# Patient Record
Sex: MALE | Race: WHITE | NOT HISPANIC OR LATINO | Employment: OTHER | URBAN - METROPOLITAN AREA
[De-identification: names, ages, dates, MRNs, and addresses within clinical notes are randomized per-mention and may not be internally consistent; named-entity substitution may affect disease eponyms.]

---

## 2018-10-01 ENCOUNTER — OFFICE VISIT (OUTPATIENT)
Dept: DERMATOLOGY | Facility: CLINIC | Age: 78
End: 2018-10-01
Payer: COMMERCIAL

## 2018-10-01 VITALS — OXYGEN SATURATION: 98 % | SYSTOLIC BLOOD PRESSURE: 147 MMHG | DIASTOLIC BLOOD PRESSURE: 82 MMHG | HEART RATE: 58 BPM

## 2018-10-01 DIAGNOSIS — Z85.828 HISTORY OF SKIN CANCER: Primary | ICD-10-CM

## 2018-10-01 DIAGNOSIS — L92.0 GRANULOMA ANNULARE: ICD-10-CM

## 2018-10-01 DIAGNOSIS — D18.00 ANGIOMA: ICD-10-CM

## 2018-10-01 DIAGNOSIS — L81.4 LENTIGO: ICD-10-CM

## 2018-10-01 DIAGNOSIS — L82.1 SK (SEBORRHEIC KERATOSIS): ICD-10-CM

## 2018-10-01 PROCEDURE — 99203 OFFICE O/P NEW LOW 30 MIN: CPT | Performed by: DERMATOLOGY

## 2018-10-01 RX ORDER — IBUPROFEN 200 MG
TABLET ORAL
COMMUNITY
Start: 2016-05-02

## 2018-10-01 RX ORDER — TRAMADOL HYDROCHLORIDE 50 MG/1
TABLET ORAL
COMMUNITY
Start: 2018-07-19

## 2018-10-01 RX ORDER — LISINOPRIL 40 MG/1
40 TABLET ORAL
COMMUNITY
Start: 2018-05-10

## 2018-10-01 RX ORDER — AMLODIPINE BESYLATE 5 MG/1
TABLET ORAL
COMMUNITY
Start: 2018-07-30

## 2018-10-01 RX ORDER — HUMAN INSULIN 100 [IU]/ML
INJECTION, SUSPENSION SUBCUTANEOUS
COMMUNITY
Start: 2018-09-29

## 2018-10-01 RX ORDER — HYDROCHLOROTHIAZIDE 12.5 MG/1
TABLET ORAL
COMMUNITY
Start: 2018-08-14

## 2018-10-01 RX ORDER — TIZANIDINE 2 MG/1
TABLET ORAL
COMMUNITY
Start: 2018-09-21

## 2018-10-01 RX ORDER — ASPIRIN 81 MG/1
81 TABLET ORAL
COMMUNITY
Start: 2014-06-19

## 2018-10-01 RX ORDER — ATORVASTATIN CALCIUM 40 MG/1
40 TABLET, FILM COATED ORAL
COMMUNITY
Start: 2018-05-10

## 2018-10-01 RX ORDER — SYRINGE AND NEEDLE,INSULIN,1ML 29 G X1/2"
SYRINGE, EMPTY DISPOSABLE MISCELLANEOUS
COMMUNITY
Start: 2018-09-29

## 2018-10-01 RX ORDER — GLUCOSAMINE HCL/CHONDROITIN SU 500-400 MG
CAPSULE ORAL
COMMUNITY
Start: 2016-08-28

## 2018-10-01 RX ORDER — NITROGLYCERIN 0.4 MG/1
0.4 TABLET SUBLINGUAL
COMMUNITY
Start: 2017-08-16

## 2018-10-01 RX ORDER — AMLODIPINE BESYLATE 5 MG/1
5 TABLET ORAL
COMMUNITY
Start: 2018-09-17

## 2018-10-01 RX ORDER — TRIAMCINOLONE ACETONIDE 5 MG/G
OINTMENT TOPICAL
COMMUNITY
Start: 2018-07-19

## 2018-10-01 RX ORDER — TIZANIDINE 2 MG/1
2 TABLET ORAL
COMMUNITY
Start: 2018-07-19

## 2018-10-01 RX ORDER — FLURBIPROFEN SODIUM 0.3 MG/ML
SOLUTION/ DROPS OPHTHALMIC
COMMUNITY
Start: 2018-06-28

## 2018-10-01 RX ORDER — SOTALOL HYDROCHLORIDE 80 MG/1
40 TABLET ORAL
COMMUNITY
Start: 2018-05-10

## 2018-10-01 RX ORDER — LEVOTHYROXINE SODIUM 125 UG/1
125 TABLET ORAL
COMMUNITY
Start: 2018-09-17

## 2018-10-01 RX ORDER — TRAMADOL HYDROCHLORIDE 50 MG/1
TABLET ORAL
COMMUNITY
Start: 2018-09-15

## 2018-10-01 NOTE — LETTER
10/1/2018         RE: Otis Mills  [address] [facility] [address]        Dear Colleague,    Thank you for referring your patient, Otis Mills, to the [facility]. Please see a copy of my visit note below.    Otis Mills is a 77 year old year old male patient here today for hx of non-melanoma skin cancer.  He notes brown spot on left temple and left neck.   .  Patient states this has been present for a while.  Patient reports the following symptoms:  none .  Patient reports the following previous treatments none.  Patient reports the following modifying factors none.  Associated symptoms: none.  Patient has no other skin complaints today.  Remainder of the HPI, Meds, PMH, Allergies, FH, and SH was reviewed in chart.      Past Medical History:   Diagnosis Date     Anxiety 10/27/2014     Arthritis 7/28/2011     Atrial fibrillation (H) 7/28/2011     Basal cell carcinoma      BPH (benign prostatic hypertrophy) 9/8/2014     Carpopedal spasm 9/8/2014     Constipation 10/27/2014     Coronary artery disease 7/28/2011     Coronary stent 7/28/2011     Depression 7/28/2011     Diabetes mellitus, type 2 (H) 9/8/2014     Disorder of thyroid 10/27/2014     Dry mouth 9/8/2014     Dystonia 9/8/2014     Electrocution 10/27/2014     Elevated PSA 7/28/2011     EMG (electromyogram) abnormalities 10/26/2014    The EMG study showed unusual recruitment pattern with very early recruitment and as amplitude increased as recruitment increased. With mild recruitment a 3Hz tremor was noted which was most obvious distally and reduced in gradient to the point that it was not noticeable in stewart biceps.      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     Headache(784.0) 9/8/2014     Hearing aid worn 7/28/2011     Hearing loss 7/28/2011     HL (hearing loss) 10/27/2014     Rosebud (hard of hearing) 9/8/2014      Hyperplastic colonic polyp 9/8/2014     Hypertension 7/28/2011     Hypertrophy of prostate with urinary obstruction and other lower urinary tract symptoms (LUTS) 9/8/2014     Hypothyroidism 7/28/2011     Insomnia 9/8/2014     Irregular heart beat 10/27/2014     Low back pain 9/8/2014     Lung nodule 7/28/2011     Memory loss 10/27/2014     Muscle tenderness 10/27/2014     Myalgia 9/8/2014     Night sweats 10/27/2014     Other chest pain (TIGHTNESS) 9/8/2014     Pain in joint, hand 9/8/2014     Positive anti-CCP test 9/8/2014    crp and sed rate are supposed to be negative. Taking plaquenil      Prostate disorder 10/27/2014     S/P coronary artery stent placement 10/27/2014     S/P rotator cuff surgery 7/28/2011     Skin cancer 7/28/2011     Tremor 7/28/2011     Umbilical hernia 7/28/2011     Unspecified cardiovascular disease (ASCVD) 9/8/2014     Unspecified vitamin D deficiency 9/8/2014     Urinary frequency 7/28/2011     Urinary tract infection 7/28/2011     Urinary urgency 7/28/2011     Weakness of both legs 9/8/2014     Wears glasses 7/28/2011       Past Surgical History:   Procedure Laterality Date     ARTHROSCOPY SHOULDER ROTATOR CUFF REPAIR      right     CARDIAC SURGERY  12/30/2008    1 stent      CARDIAC SURGERY      angioplasty     HERNIA REPAIR      left     hernia surgery      right     hernia surgery      left     hernia surgery      right     KNEE SURGERY  11/23/2009    right knee arthroscopic partial medial mesicectomy with decompression     PROSTATE SURGERY  2012    10/24/12 - quanta laser of prostate     SINUS SURGERY       skin surgery      skin cancer     TURP  2013    1/9/2013        Family History   Problem Relation Age of Onset     Cerebrovascular Disease Father      75     Myocardial Infarction Father      Dementia Father      Cerebrovascular Disease Mother      HEART DISEASE Mother      Hypertension Brother      x 4     Lipids Brother      x 4     Dementia Other      1/2 sister     Dementia  Brother      1/2 brother       Social History     Social History     Marital status:      Spouse name: N/A     Number of children: N/A     Years of education: N/A     Occupational History     Not on file.     Social History Main Topics     Smoking status: Former Smoker     Smokeless tobacco: Never Used      Comment: quit 40 yrs ago.      Alcohol use Yes      Comment: rare to none     Drug use: Not on file     Sexual activity: Not on file     Other Topics Concern     Not on file     Social History Narrative    Lives in [place]    Wife Kady    2 daughters    1 son           Outpatient Encounter Prescriptions as of 10/1/2018   Medication Sig Dispense Refill     amlodipine (NORVASC) 10 MG tablet Take 10 mg by mouth daily.       amLODIPine (NORVASC) 5 MG tablet Take 5 mg by mouth       amLODIPine (NORVASC) 5 MG tablet        aspirin 81 MG EC tablet Take 81 mg by mouth       aspirin 81 MG tablet Take 1 tablet by mouth daily.       atorvastatin (LIPITOR) 40 MG tablet Take 40 mg by mouth       Atorvastatin Calcium (LIPITOR PO) Take 20 mg by mouth daily       B-D U/F insulin pen needle        carbidopa-levodopa (SINEMET)  MG per tablet 1/2 tablet daily twice daily. 90 tablet 11     diazepam (VALIUM) 5 MG tablet Take  by mouth. 1 tab (5mg) 30 minutes Prior to mri scan; may repeat x 1 2 tablet 0     Glucose Blood (BLOOD GLUCOSE TEST STRIPS) STRP USE AS DIRECTED: TESTS UP TO 3 TIMES DAILY       hydrochlorothiazide (HYDRODIURIL) 25 MG tablet Take 25 mg by mouth daily.       hydrochlorothiazide 12.5 MG TABS tablet        ibuprofen (ADVIL/MOTRIN) 200 MG tablet        insulin regular (HUMULIN R/NOVOLIN R) 100 UNIT/ML injection Inject 4 units before breakfast and 6 units before supper.       levothyroxine (LEVOTHROID) 75 MCG tablet Take 100 mcg by mouth        levothyroxine (SYNTHROID/LEVOTHROID) 125 MCG tablet Take 125 mcg by mouth       lisinopril (PRINIVIL,ZESTRIL) 40 MG tablet Take 40 mg by mouth daily.        "lisinopril (PRINIVIL/ZESTRIL) 40 MG tablet Take 40 mg by mouth       lorazepam (ATIVAN) 0.5 MG tablet Take  by mouth. Take at least 30 minutes prior to mri scan.  May repeat x 1 if needed. Do not operate a vehicle after taking this medication 3 tablet 0     metFORMIN (GLUCOPHAGE) 1000 MG tablet Take 1,000 mg by mouth       metformin (GLUCOPHAGE) 1000 MG tablet Take 1,000 mg by mouth 2 times daily (with meals).       nitroGLYcerin (NITROSTAT) 0.4 MG sublingual tablet Place 0.4 mg under the tongue       NOVOLIN N RELION 100 UNIT/ML susp        omeprazole (PRILOSEC) 20 MG capsule Take 20 mg by mouth daily.       omeprazole (PRILOSEC) 20 MG CR capsule Take 20 mg by mouth       RELION INSULIN SYRINGE 31G X 5/16\" 0.3 ML        simvastatin (ZOCOR) 40 MG tablet Take 40 mg by mouth At Bedtime.       sotalol (BETAPACE) 80 MG tablet Take 40 mg by mouth       sotalol (BETAPACE) 80 MG tablet Take 0.5 tablets by mouth 2 times daily.       tamsulosin (FLOMAX) 0.4 MG 24 hr capsule Take 0.4 mg by mouth daily.       tiZANidine (ZANAFLEX) 2 MG tablet Take 2 mg by mouth       tiZANidine (ZANAFLEX) 2 MG tablet        traMADol (ULTRAM) 50 MG tablet Take 1 tab PO 2-3 times daily if needed for pain. To fill on/after 8/6/2018       traMADol (ULTRAM) 50 MG tablet        triamcinolone (KENALOG) 0.5 % OINT ointment        No facility-administered encounter medications on file as of 10/1/2018.              Review Of Systems  Skin: As above  Eyes: negative  Ears/Nose/Throat: negative  Respiratory: No shortness of breath, dyspnea on exertion, cough, or hemoptysis  Cardiovascular: negative  Gastrointestinal: negative  Genitourinary: negative  Musculoskeletal: negative  Neurologic: negative  Psychiatric: negative  Hematologic/Lymphatic/Immunologic: negative  Endocrine: negative      O:   NAD, WDWN, Alert & Oriented, Mood & Affect wnl, Vitals stable   Here today alone   /82  Pulse 58  SpO2 98%   General appearance normal   Vitals " stable   Alert, oriented and in no acute distress      Following lymph nodes palpated: Occipital, Cervical, Supraclavicular no lad   Stuck on papules and brown macules on trunk and ext    L neck and L temple stuck on papule   Red papules on trunk   Annular red plaques on trunk         The remainder of the full exam was unremarkable; the following areas were examined:  conjunctiva/lids, oral mucosa, neck, peripheral vascular system, abdomen, lymph nodes, digits/nails, eccrine and apocrine glands, scalp/hair, face, neck, chest, abdomen, buttocks, back, RUE, LUE, RLE, LLE       Eyes: Conjunctivae/lids:Normal     ENT: Lips, buccal mucosa, tongue: normal    MSK:Normal    Cardiovascular: peripheral edema none    Pulm: Breathing Normal    Lymph Nodes: No Head and Neck Lymphadenopathy     Neuro/Psych: Orientation:Normal; Mood/Affect:Normal      A/P:  1. GA  He declines treatment  2. Hx of basal cell carcinoma, seborrheic keratosis, letnigo, angioma  BENIGN LESIONS DISCUSSED WITH PATIENT:  I discussed the specifics of tumor, prognosis, and genetics of benign lesions.  I explained that treatment of these lesions would be purely cosmetic and not medically neccessary.  I discussed with patient different removal options including excision, cautery and /or laser.      Nature and genetics of benign skin lesions dicussed with patient.  Signs and Symptoms of skin cancer discussed with patient.  ABCDEs of melanoma reviewed with patient.  Patient encouraged to perform monthly skin exams.  UV precautions reviewed with patient.  Patient to follow up with Primary Care provider regarding elevated blood pressure.  Skin care regimen reviewed with patient: Eliminate harsh soaps, i.e. Dial, zest, irsih spring; Mild soaps such as Cetaphil or Dove sensitive skin, avoid hot or cold showers, aggressive use of emollients including vanicream, cetaphil or cerave discussed with patient.    Risks of non-melanoma skin cancer discussed with patient    Return to clinic 12 months  Patient to follow up with Primary Care provider regarding elevated blood pressure.          Again, thank you for allowing me to participate in the care of your patient.        Sincerely,        Michael Louis MD

## 2018-10-01 NOTE — NURSING NOTE
"Initial /82  Pulse 58  SpO2 98% Estimated body mass index is 27.14 kg/(m^2) as calculated from the following:    Height as of 10/27/14: 1.727 m (5' 8\").    Weight as of 10/27/14: 81 kg (178 lb 8 oz). .      "

## 2018-10-01 NOTE — MR AVS SNAPSHOT
After Visit Summary   10/1/2018    Otis Mills    MRN: [id number]           Patient Information     Date Of Birth          1940        Visit Information        Provider Department      10/1/2018 9:15 AM Michael Louis MD [facility]        Today's Diagnoses     History of skin cancer    -  1    Lentigo        SK (seborrheic keratosis)        Angioma        Granuloma annulare           Follow-ups after your visit        Who to contact     If you have questions or need follow up information about today's clinic visit or your schedule please contact [facility] directly at [phone].  Normal or non-critical lab and imaging results will be communicated to you by MyChart, letter or phone within 4 business days after the clinic has received the results. If you do not hear from us within 7 days, please contact the clinic through MyChart or phone. If you have a critical or abnormal lab result, we will notify you by phone as soon as possible.  Submit refill requests through [facility] [employer] or call your pharmacy and they will forward the refill request to us. Please allow 3 business days for your refill to be completed.          Additional Information About Your Visit        Care EveryWhere ID     This is your Care EveryWhere ID. This could be used by other organizations to access your [place] medical records  [id number]        Your Vitals Were     Pulse Pulse Oximetry                58 98%           Blood Pressure from Last 3 Encounters:   10/01/18 147/82   10/27/14 129/71   07/28/11 132/69    Weight from Last 3 Encounters:   10/27/14 81 kg (178 lb 8 oz)   07/28/11 75.8 kg (167 lb)              Today, you had the following     No orders found for display      Information about OPIOIDS     PRESCRIPTION OPIOIDS: WHAT YOU NEED TO KNOW   We gave you an opioid (narcotic) pain medicine. It is important to manage your pain, but opioids are not always the best choice. You should  first try all the other options your care team gave you. Take this medicine for as short a time (and as few doses) as possible.    Some activities can increase your pain, such as bandage changes or therapy sessions. It may help to take your pain medicine 30 to 60 minutes before these activities. Reduce your stress by getting enough sleep, working on hobbies you enjoy and practicing relaxation or meditation. Talk to your care team about ways to manage your pain beyond prescription opioids.    These medicines have risks:    DO NOT drive when on new or higher doses of pain medicine. These medicines can affect your alertness and reaction times, and you could be arrested for driving under the influence (DUI). If you need to use opioids long-term, talk to your care team about driving.    DO NOT operate heavy machinery    DO NOT do any other dangerous activities while taking these medicines.    DO NOT drink any alcohol while taking these medicines.     If the opioid prescribed includes acetaminophen, DO NOT take with any other medicines that contain acetaminophen. Read all labels carefully. Look for the word  acetaminophen  or  Tylenol.  Ask your pharmacist if you have questions or are unsure.    You can get addicted to pain medicines, especially if you have a history of addiction (chemical, alcohol or substance dependence). Talk to your care team about ways to reduce this risk.    All opioids tend to cause constipation. Drink plenty of water and eat foods that have a lot of fiber, such as fruits, vegetables, prune juice, apple juice and high-fiber cereal. Take a laxative (Miralax, milk of magnesia, Colace, Senna) if you don t move your bowels at least every other day. Other side effects include upset stomach, sleepiness, dizziness, throwing up, tolerance (needing more of the medicine to have the same effect), physical dependence and slowed breathing.    Store your pills in a secure place, locked if possible. We will not  replace any lost or stolen medicine. If you don t finish your medicine, please throw away (dispose) as directed by your pharmacist. The [place] Pollution [employer] has more information about safe disposal: https://www.pca.state.[place]/living-green/managing-unwanted-medications         Primary Care Provider Office Phone # Fax #    Nora Ingram [phone]       [facility] [address] [place] [address]        Equal Access to Services     JEANINE BOYKIN : Hadii aad ku hadasho Soomaali, waaxda luqadaha, qaybta kaalmada adeegyada, waxay idiin hayaan adeeg kharash la'lencho . So [facility] [phone].    ATENCIÓN: Si habla español, tiene a tai disposición servicios gratuitos de asistencia lingüística. [facility] [phone].    We comply with applicable federal civil rights laws and [place] laws. We do not discriminate on the basis of race, color, national origin, age, disability, sex, sexual orientation, or gender identity.            Thank you!     Thank you for choosing [facility]  for your care. Our goal is always to provide you with excellent care. Hearing back from our patients is one way we can continue to improve our services. Please take a few minutes to complete the written survey that you may receive in the mail after your visit with us. Thank you!             Your Updated Medication List - Protect others around you: Learn how to safely use, store and throw away your medicines at www.disposemymeds.org.          This list is accurate as of 10/1/18  9:37 AM.  Always use your most recent med list.                   Brand Name Dispense Instructions for use Diagnosis    * amLODIPine 5 MG tablet    NORVASC          * amLODIPine 5 MG tablet    NORVASC     Take 5 mg by mouth        * amLODIPine 10 MG tablet    NORVASC     Take 10 mg by mouth daily.        * aspirin 81 MG EC tablet      Take 81 mg by mouth        * aspirin 81 MG tablet      Take 1 tablet by mouth daily.  "       B-D U/F 31G X 5 MM   Generic drug:  insulin pen needle           BLOOD GLUCOSE TEST STRIPS Strp      USE AS DIRECTED: TESTS UP TO 3 TIMES DAILY        diazepam 5 MG tablet    VALIUM    2 tablet    Take  by mouth. 1 tab (5mg) 30 minutes Prior to mri scan; may repeat x 1    Anxiety       * hydrochlorothiazide 12.5 MG Tabs tablet           * hydrochlorothiazide 25 MG tablet    HYDRODIURIL     Take 25 mg by mouth daily.        ibuprofen 200 MG tablet    ADVIL/MOTRIN          insulin regular 100 UNIT/ML injection    HumuLIN R/NovoLIN R     Inject 4 units before breakfast and 6 units before supper.        * levothyroxine 125 MCG tablet    SYNTHROID/LEVOTHROID     Take 125 mcg by mouth        * LEVOTHROID 75 MCG tablet   Generic drug:  levothyroxine      Take 100 mcg by mouth        * LIPITOR PO      Take 20 mg by mouth daily        * atorvastatin 40 MG tablet    LIPITOR     Take 40 mg by mouth        * lisinopril 40 MG tablet    PRINIVIL/ZESTRIL     Take 40 mg by mouth        * lisinopril 40 MG tablet    PRINIVIL/ZESTRIL     Take 40 mg by mouth daily.        LORazepam 0.5 MG tablet    ATIVAN    3 tablet    Take  by mouth. Take at least 30 minutes prior to mri scan.  May repeat x 1 if needed. Do not operate a vehicle after taking this medication    Tremor       * metFORMIN 1000 MG tablet    GLUCOPHAGE     Take 1,000 mg by mouth        * metFORMIN 1000 MG tablet    GLUCOPHAGE     Take 1,000 mg by mouth 2 times daily (with meals).        nitroGLYcerin 0.4 MG sublingual tablet    NITROSTAT     Place 0.4 mg under the tongue        NovoLIN N RELION 100 UNIT/ML injection   Generic drug:  insulin NPH           * omeprazole 20 MG CR capsule    priLOSEC     Take 20 mg by mouth        * omeprazole 20 MG CR capsule    priLOSEC     Take 20 mg by mouth daily.        RELION INSULIN SYRINGE 31G X 5/16\" 0.3 ML   Generic drug:  insulin syringe-needle U-100           simvastatin 40 MG tablet    ZOCOR     Take 40 mg by mouth At " Bedtime.        SINEMET  MG per tablet   Generic drug:  carbidopa-levodopa     90 tablet    1/2 tablet daily twice daily.    Tremor       * sotalol 80 MG tablet    BETAPACE     Take 40 mg by mouth        * sotalol 80 MG tablet    BETAPACE     Take 0.5 tablets by mouth 2 times daily.        tamsulosin 0.4 MG capsule    FLOMAX     Take 0.4 mg by mouth daily.        * tiZANidine 2 MG tablet    ZANAFLEX     Take 2 mg by mouth        * tiZANidine 2 MG tablet    ZANAFLEX          * traMADol 50 MG tablet    ULTRAM     Take 1 tab PO 2-3 times daily if needed for pain. To fill on/after 8/6/2018        * traMADol 50 MG tablet    ULTRAM          triamcinolone 0.5 % Oint ointment    KENALOG          * Notice:  This list has 23 medication(s) that are the same as other medications prescribed for you. Read the directions carefully, and ask your doctor or other care provider to review them with you.

## 2018-10-01 NOTE — PROGRESS NOTES
Otis Mills is a 77 year old year old male patient here today for hx of non-melanoma skin cancer.  He notes brown spot on left temple and left neck.   .  Patient states this has been present for a while.  Patient reports the following symptoms:  none .  Patient reports the following previous treatments none.  Patient reports the following modifying factors none.  Associated symptoms: none.  Patient has no other skin complaints today.  Remainder of the HPI, Meds, PMH, Allergies, FH, and SH was reviewed in chart.      Past Medical History:   Diagnosis Date     Anxiety 10/27/2014     Arthritis 7/28/2011     Atrial fibrillation (H) 7/28/2011     Basal cell carcinoma      BPH (benign prostatic hypertrophy) 9/8/2014     Carpopedal spasm 9/8/2014     Constipation 10/27/2014     Coronary artery disease 7/28/2011     Coronary stent 7/28/2011     Depression 7/28/2011     Diabetes mellitus, type 2 (H) 9/8/2014     Disorder of thyroid 10/27/2014     Dry mouth 9/8/2014     Dystonia 9/8/2014     Electrocution 10/27/2014     Elevated PSA 7/28/2011     EMG (electromyogram) abnormalities 10/26/2014    The EMG study showed unusual recruitment pattern with very early recruitment and as amplitude increased as recruitment increased. With mild recruitment a 3Hz tremor was noted which was most obvious distally and reduced in gradient to the point that it was not noticeable in stewart biceps.      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     Headache(784.0) 9/8/2014     Hearing aid worn 7/28/2011     Hearing loss 7/28/2011     HL (hearing loss) 10/27/2014     [place] (hard of hearing) 9/8/2014     Hyperplastic colonic polyp 9/8/2014     Hypertension 7/28/2011     Hypertrophy of prostate with urinary obstruction and other lower urinary tract symptoms (LUTS) 9/8/2014     Hypothyroidism 7/28/2011     Insomnia 9/8/2014     Irregular heart beat  10/27/2014     Low back pain 9/8/2014     Lung nodule 7/28/2011     Memory loss 10/27/2014     Muscle tenderness 10/27/2014     Myalgia 9/8/2014     Night sweats 10/27/2014     Other chest pain (TIGHTNESS) 9/8/2014     Pain in joint, hand 9/8/2014     Positive anti-CCP test 9/8/2014    crp and sed rate are supposed to be negative. Taking plaquenil      Prostate disorder 10/27/2014     S/P coronary artery stent placement 10/27/2014     S/P rotator cuff surgery 7/28/2011     Skin cancer 7/28/2011     Tremor 7/28/2011     Umbilical hernia 7/28/2011     Unspecified cardiovascular disease (ASCVD) 9/8/2014     Unspecified vitamin D deficiency 9/8/2014     Urinary frequency 7/28/2011     Urinary tract infection 7/28/2011     Urinary urgency 7/28/2011     Weakness of both legs 9/8/2014     Wears glasses 7/28/2011       Past Surgical History:   Procedure Laterality Date     ARTHROSCOPY SHOULDER ROTATOR CUFF REPAIR      right     CARDIAC SURGERY  12/30/2008    1 stent      CARDIAC SURGERY      angioplasty     HERNIA REPAIR      left     hernia surgery      right     hernia surgery      left     hernia surgery      right     KNEE SURGERY  11/23/2009    right knee arthroscopic partial medial mesicectomy with decompression     PROSTATE SURGERY  2012    10/24/12 - quanta laser of prostate     SINUS SURGERY       skin surgery      skin cancer     TURP  2013    1/9/2013        Family History   Problem Relation Age of Onset     Cerebrovascular Disease Father      75     Myocardial Infarction Father      Dementia Father      Cerebrovascular Disease Mother      HEART DISEASE Mother      Hypertension Brother      x 4     Lipids Brother      x 4     Dementia Other      1/2 sister     Dementia Brother      1/2 brother       Social History     Social History     Marital status:      Spouse name: N/A     Number of children: N/A     Years of education: N/A     Occupational History     Not on file.     Social History Main Topics      Smoking status: Former Smoker     Smokeless tobacco: Never Used      Comment: quit 40 yrs ago.      Alcohol use Yes      Comment: rare to none     Drug use: Not on file     Sexual activity: Not on file     Other Topics Concern     Not on file     Social History Narrative    Lives in [place]    Wife Kady    2 daughters    1 son           Outpatient Encounter Prescriptions as of 10/1/2018   Medication Sig Dispense Refill     amlodipine (NORVASC) 10 MG tablet Take 10 mg by mouth daily.       amLODIPine (NORVASC) 5 MG tablet Take 5 mg by mouth       amLODIPine (NORVASC) 5 MG tablet        aspirin 81 MG EC tablet Take 81 mg by mouth       aspirin 81 MG tablet Take 1 tablet by mouth daily.       atorvastatin (LIPITOR) 40 MG tablet Take 40 mg by mouth       Atorvastatin Calcium (LIPITOR PO) Take 20 mg by mouth daily       B-D U/F insulin pen needle        carbidopa-levodopa (SINEMET)  MG per tablet 1/2 tablet daily twice daily. 90 tablet 11     diazepam (VALIUM) 5 MG tablet Take  by mouth. 1 tab (5mg) 30 minutes Prior to mri scan; may repeat x 1 2 tablet 0     Glucose Blood (BLOOD GLUCOSE TEST STRIPS) STRP USE AS DIRECTED: TESTS UP TO 3 TIMES DAILY       hydrochlorothiazide (HYDRODIURIL) 25 MG tablet Take 25 mg by mouth daily.       hydrochlorothiazide 12.5 MG TABS tablet        ibuprofen (ADVIL/MOTRIN) 200 MG tablet        insulin regular (HUMULIN R/NOVOLIN R) 100 UNIT/ML injection Inject 4 units before breakfast and 6 units before supper.       levothyroxine (LEVOTHROID) 75 MCG tablet Take 100 mcg by mouth        levothyroxine (SYNTHROID/LEVOTHROID) 125 MCG tablet Take 125 mcg by mouth       lisinopril (PRINIVIL,ZESTRIL) 40 MG tablet Take 40 mg by mouth daily.       lisinopril (PRINIVIL/ZESTRIL) 40 MG tablet Take 40 mg by mouth       lorazepam (ATIVAN) 0.5 MG tablet Take  by mouth. Take at least 30 minutes prior to mri scan.  May repeat x 1 if needed. Do not operate a vehicle after taking this medication 3 tablet  "0     metFORMIN (GLUCOPHAGE) 1000 MG tablet Take 1,000 mg by mouth       metformin (GLUCOPHAGE) 1000 MG tablet Take 1,000 mg by mouth 2 times daily (with meals).       nitroGLYcerin (NITROSTAT) 0.4 MG sublingual tablet Place 0.4 mg under the tongue       NOVOLIN N RELION 100 UNIT/ML susp        omeprazole (PRILOSEC) 20 MG capsule Take 20 mg by mouth daily.       omeprazole (PRILOSEC) 20 MG CR capsule Take 20 mg by mouth       RELION INSULIN SYRINGE 31G X 5/16\" 0.3 ML        simvastatin (ZOCOR) 40 MG tablet Take 40 mg by mouth At Bedtime.       sotalol (BETAPACE) 80 MG tablet Take 40 mg by mouth       sotalol (BETAPACE) 80 MG tablet Take 0.5 tablets by mouth 2 times daily.       tamsulosin (FLOMAX) 0.4 MG 24 hr capsule Take 0.4 mg by mouth daily.       tiZANidine (ZANAFLEX) 2 MG tablet Take 2 mg by mouth       tiZANidine (ZANAFLEX) 2 MG tablet        traMADol (ULTRAM) 50 MG tablet Take 1 tab PO 2-3 times daily if needed for pain. To fill on/after 8/6/2018       traMADol (ULTRAM) 50 MG tablet        triamcinolone (KENALOG) 0.5 % OINT ointment        No facility-administered encounter medications on file as of 10/1/2018.              Review Of Systems  Skin: As above  Eyes: negative  Ears/Nose/Throat: negative  Respiratory: No shortness of breath, dyspnea on exertion, cough, or hemoptysis  Cardiovascular: negative  Gastrointestinal: negative  Genitourinary: negative  Musculoskeletal: negative  Neurologic: negative  Psychiatric: negative  Hematologic/Lymphatic/Immunologic: negative  Endocrine: negative      O:   NAD, WDWN, Alert & Oriented, Mood & Affect wnl, Vitals stable   Here today alone   /82  Pulse 58  SpO2 98%   General appearance normal   Vitals stable   Alert, oriented and in no acute distress      Following lymph nodes palpated: Occipital, Cervical, Supraclavicular no lad   Stuck on papules and brown macules on trunk and ext    L neck and L temple stuck on papule   Red papules on trunk   Annular red " plaques on trunk         The remainder of the full exam was unremarkable; the following areas were examined:  conjunctiva/lids, oral mucosa, neck, peripheral vascular system, abdomen, lymph nodes, digits/nails, eccrine and apocrine glands, scalp/hair, face, neck, chest, abdomen, buttocks, back, RUE, LUE, RLE, LLE       Eyes: Conjunctivae/lids:Normal     ENT: Lips, buccal mucosa, tongue: normal    MSK:Normal    Cardiovascular: peripheral edema none    Pulm: Breathing Normal    Lymph Nodes: No Head and Neck Lymphadenopathy     Neuro/Psych: Orientation:Normal; Mood/Affect:Normal      A/P:  1. GA  He declines treatment  2. Hx of basal cell carcinoma, seborrheic keratosis, letnigo, angioma  BENIGN LESIONS DISCUSSED WITH PATIENT:  I discussed the specifics of tumor, prognosis, and genetics of benign lesions.  I explained that treatment of these lesions would be purely cosmetic and not medically neccessary.  I discussed with patient different removal options including excision, cautery and /or laser.      Nature and genetics of benign skin lesions dicussed with patient.  Signs and Symptoms of skin cancer discussed with patient.  ABCDEs of melanoma reviewed with patient.  Patient encouraged to perform monthly skin exams.  UV precautions reviewed with patient.  Patient to follow up with Primary Care provider regarding elevated blood pressure.  Skin care regimen reviewed with patient: Eliminate harsh soaps, i.e. Dial, zest, irsih spring; Mild soaps such as Cetaphil or Dove sensitive skin, avoid hot or cold showers, aggressive use of emollients including vanicream, cetaphil or cerave discussed with patient.    Risks of non-melanoma skin cancer discussed with patient   Return to clinic 12 months  Patient to follow up with Primary Care provider regarding elevated blood pressure.

## 2019-07-02 ENCOUNTER — HOSPITAL ENCOUNTER (OUTPATIENT)
Dept: OUTPATIENT PROCEDURES | Facility: CLINIC | Age: 79
Discharge: HOME | End: 2019-07-02
Attending: OPHTHALMOLOGY | Admitting: OPHTHALMOLOGY
Payer: COMMERCIAL

## 2019-07-02 PROCEDURE — 66821 AFTER CATARACT LASER SURGERY: CPT | Mod: RT | Performed by: OPHTHALMOLOGY
